# Patient Record
Sex: MALE | Race: OTHER | NOT HISPANIC OR LATINO | URBAN - METROPOLITAN AREA
[De-identification: names, ages, dates, MRNs, and addresses within clinical notes are randomized per-mention and may not be internally consistent; named-entity substitution may affect disease eponyms.]

---

## 2024-09-03 NOTE — ASU PATIENT PROFILE, ADULT - REASON FOR ADMISSION, PROFILE
PARS PLANA VITRECTOMY TO PEEL MACULAR PUCKER AND REMOVE VITREOUS HEMORRHAGE LEFT EYE, ENDO LASER POSSIBLE GAS OR OIL

## 2024-09-03 NOTE — ASU PATIENT PROFILE, ADULT - NS PREOP UNDERSTANDS INFO
No solid food/dairy/candy/gum after midnight tonight; water/clear apple juice/black coffee/tea/Gatorade allowed before 07:00am tomorrow; patient reminded to come with photo ID/insurance/credit card; dress in comfortable clothes; no jewelries/contact lens/valuable; no smoking/alcohol drinking/recreational drug use today; escort to have photo ID; address and callback number was given/yes

## 2024-09-03 NOTE — ASU PATIENT PROFILE, ADULT - ABLE TO REACH PT
Left voicemail message with instruction and arrive time on [phone] & [phone]; instructed to arrive at 08:45am; No solid food/dairy/candy/gum after midnight tonight; water/clear apple juice/black coffee/tea/Gatorade allowed before 05:30am tomorrow; patient reminded to come with photo ID/insurance/credit card; dress in comfortable clothes; no jewelries/contact lens/valuable; no smoking/alcohol drinking/recreational drug use today; escort to have photo ID; address and callback number was given/yes

## 2024-09-04 ENCOUNTER — OUTPATIENT (OUTPATIENT)
Dept: OUTPATIENT SERVICES | Facility: HOSPITAL | Age: 59
LOS: 1 days | Discharge: HOME | End: 2024-09-04

## 2024-09-04 VITALS
TEMPERATURE: 99 F | OXYGEN SATURATION: 96 % | DIASTOLIC BLOOD PRESSURE: 75 MMHG | HEART RATE: 85 BPM | SYSTOLIC BLOOD PRESSURE: 129 MMHG | RESPIRATION RATE: 16 BRPM

## 2024-09-04 VITALS
OXYGEN SATURATION: 98 % | DIASTOLIC BLOOD PRESSURE: 71 MMHG | HEIGHT: 67 IN | WEIGHT: 141.54 LBS | TEMPERATURE: 97 F | RESPIRATION RATE: 18 BRPM | HEART RATE: 87 BPM | SYSTOLIC BLOOD PRESSURE: 121 MMHG

## 2024-09-04 DIAGNOSIS — M79.89 OTHER SPECIFIED SOFT TISSUE DISORDERS: Chronic | ICD-10-CM

## 2024-09-04 DIAGNOSIS — H43.10 VITREOUS HEMORRHAGE, UNSPECIFIED EYE: ICD-10-CM

## 2024-09-04 DIAGNOSIS — H33.302 UNSPECIFIED RETINAL BREAK, LEFT EYE: ICD-10-CM

## 2024-09-04 LAB — GLUCOSE BLDC GLUCOMTR-MCNC: 122 MG/DL — HIGH (ref 70–99)

## 2024-09-04 DEVICE — LASER PROBE 25G CONSTELLATION
Type: IMPLANTABLE DEVICE | Status: NON-FUNCTIONAL
Removed: 2024-09-04

## 2024-09-04 RX ORDER — ACETAMINOPHEN 325 MG/1
650 TABLET ORAL ONCE
Refills: 0 | Status: DISCONTINUED | OUTPATIENT
Start: 2024-09-04 | End: 2024-09-04

## 2024-09-04 RX ORDER — INSULIN GLARGINE 100 [IU]/ML
32 INJECTION, SOLUTION SUBCUTANEOUS
Refills: 0 | DISCHARGE

## 2024-09-04 RX ORDER — LOSARTAN POTASSIUM 50 MG/1
1 TABLET ORAL
Refills: 0 | DISCHARGE

## 2024-09-04 RX ORDER — METFORMIN HYDROCHLORIDE 850 MG/1
1 TABLET, FILM COATED ORAL
Refills: 0 | DISCHARGE

## 2024-09-04 RX ORDER — EMPAGLIFLOZIN 10 MG/1
1 TABLET, FILM COATED ORAL
Refills: 0 | DISCHARGE

## 2024-09-04 RX ORDER — DULAGLUTIDE 1.5 MG/.5ML
3 INJECTION, SOLUTION SUBCUTANEOUS
Refills: 0 | DISCHARGE

## 2024-09-04 RX ORDER — TETRACAINE HCL 0.5 %
1 DROPS OPHTHALMIC (EYE) ONCE
Refills: 0 | Status: COMPLETED | OUTPATIENT
Start: 2024-09-04 | End: 2024-09-04

## 2024-09-04 RX ORDER — ASPIRIN 81 MG
1 TABLET, DELAYED RELEASE (ENTERIC COATED) ORAL
Refills: 0 | DISCHARGE

## 2024-09-04 RX ORDER — SIMVASTATIN 10 MG
1 TABLET ORAL
Refills: 0 | DISCHARGE

## 2024-09-04 RX ORDER — TROPICAMIDE 1 %
1 DROPS OPHTHALMIC (EYE)
Refills: 0 | Status: COMPLETED | OUTPATIENT
Start: 2024-09-04 | End: 2024-09-04

## 2024-09-04 RX ADMIN — Medication 1 DROP(S): at 08:00

## 2024-09-04 RX ADMIN — Medication 1 DROP(S): at 09:05

## 2024-09-04 RX ADMIN — Medication 1 DROP(S): at 09:10

## 2024-09-04 RX ADMIN — Medication 1 DROP(S): at 09:00

## 2024-09-04 NOTE — BRIEF OPERATIVE NOTE - NSICDXBRIEFPROCEDURE_GEN_ALL_CORE_FT
PROCEDURES:  Vitrectomy, with endolaser panretinal photocoagulation and epiretinal membrane removal 04-Sep-2024 11:37:18  Spencer Perkins

## 2024-09-04 NOTE — BRIEF OPERATIVE NOTE - NSICDXBRIEFPREOP_GEN_ALL_CORE_FT
PRE-OP DIAGNOSIS:  Vitreous hemorrhage 04-Sep-2024 11:37:30  Spencer Perkins  Epiretinal membrane, left 04-Sep-2024 11:37:41  Spencer Perkins

## 2024-09-04 NOTE — BRIEF OPERATIVE NOTE - NSICDXBRIEFPOSTOP_GEN_ALL_CORE_FT
POST-OP DIAGNOSIS:  Vitreous hemorrhage 04-Sep-2024 11:37:46  Spencer Perkins  Retinal break, left 04-Sep-2024 11:38:09  Spencer Perkins  Epiretinal membrane, left 04-Sep-2024 11:37:54  Spencer Perkins

## 2024-09-04 NOTE — PACU DISCHARGE NOTE - NSCLINEINSERTRD_GEN_ALL_CORE
07/22/22 1101   Hello, [Guardians Name / Ben Vilchis, this is [Caller Fredis Chen from [facility], and our clinical care team wanted to check on you / your child after your recent visit to the hospital  It will only take 3-5 minutes  Is this a good time? Discharge Call Type/ Specific Diagnosis: General Call   General Discharge Phone Call   Were your/your child's discharge instructions clear and understandable? Please tell me in your own words how to care for yourself/your child now that you're home Yes;Patient understood instructions   Have you filled your/your child's new prescriptions yet? Yes   What questions do you have about those medications? No questions   Are you/your child having any unusual symptoms or problems? (Specific to problem- i e , dressing, pain, bruising or swelling, procedure, etc ) No reported symptoms/problems   Do you have follow up appointment with your/your child's physician? Yes   Is there anything preventing you from keeping that appointment? No;Patient able to keep appointment   Are there any physicians, nurses, or hospital staff you would like us to recognize for doing a very good job? Nurse;PCA/Tech;PT/OT/RT/SLP  (Everyone did a great job!)   Thank you for taking the time to share with me about your care and recovery  Do you have any suggestions for us? No   This call resulted in: No interventions needed   Call Complete   Attempted Number of Calls 1   Discharge phone call complete? Complete   Hi, This is ________ from [facility]  This is just a courtesy call, and there is no need to call us back  Have a great day     (Called by [facility]) No

## 2024-09-04 NOTE — PRE-ANESTHESIA EVALUATION ADULT - NSANTHOSAYNRD_GEN_A_CORE
No. PRATIK screening performed.  STOP BANG Legend: 0-2 = LOW Risk; 3-4 = INTERMEDIATE Risk; 5-8 = HIGH Risk

## 2025-02-12 PROBLEM — E11.9 TYPE 2 DIABETES MELLITUS WITHOUT COMPLICATIONS: Chronic | Status: ACTIVE | Noted: 2024-09-03

## 2025-02-12 PROBLEM — E78.5 HYPERLIPIDEMIA, UNSPECIFIED: Chronic | Status: ACTIVE | Noted: 2024-09-03

## 2025-02-12 PROBLEM — I10 ESSENTIAL (PRIMARY) HYPERTENSION: Chronic | Status: ACTIVE | Noted: 2024-09-03

## 2025-02-26 ENCOUNTER — TRANSCRIPTION ENCOUNTER (OUTPATIENT)
Age: 60
End: 2025-02-26

## 2025-02-26 ENCOUNTER — INPATIENT (INPATIENT)
Facility: HOSPITAL | Age: 60
LOS: 0 days | Discharge: HOME | End: 2025-02-26
Attending: INTERNAL MEDICINE | Admitting: INTERNAL MEDICINE
Payer: MEDICAID

## 2025-02-26 VITALS
HEIGHT: 66 IN | TEMPERATURE: 98 F | DIASTOLIC BLOOD PRESSURE: 70 MMHG | HEART RATE: 85 BPM | OXYGEN SATURATION: 99 % | SYSTOLIC BLOOD PRESSURE: 139 MMHG | WEIGHT: 139.99 LBS | RESPIRATION RATE: 16 BRPM

## 2025-02-26 VITALS
SYSTOLIC BLOOD PRESSURE: 124 MMHG | HEART RATE: 77 BPM | RESPIRATION RATE: 15 BRPM | OXYGEN SATURATION: 97 % | DIASTOLIC BLOOD PRESSURE: 68 MMHG

## 2025-02-26 DIAGNOSIS — M79.89 OTHER SPECIFIED SOFT TISSUE DISORDERS: Chronic | ICD-10-CM

## 2025-02-26 DIAGNOSIS — R93.1 ABNORMAL FINDINGS ON DIAGNOSTIC IMAGING OF HEART AND CORONARY CIRCULATION: ICD-10-CM

## 2025-02-26 LAB
ANION GAP SERPL CALC-SCNC: 10 MMOL/L — SIGNIFICANT CHANGE UP (ref 7–14)
BUN SERPL-MCNC: 17 MG/DL — SIGNIFICANT CHANGE UP (ref 7–23)
CALCIUM SERPL-MCNC: 9.9 MG/DL — SIGNIFICANT CHANGE UP (ref 8.4–10.5)
CHLORIDE SERPL-SCNC: 103 MMOL/L — SIGNIFICANT CHANGE UP (ref 98–107)
CO2 SERPL-SCNC: 26 MMOL/L — SIGNIFICANT CHANGE UP (ref 22–31)
CREAT SERPL-MCNC: 0.88 MG/DL — SIGNIFICANT CHANGE UP (ref 0.5–1.3)
EGFR: 98 ML/MIN/1.73M2 — SIGNIFICANT CHANGE UP
GLUCOSE SERPL-MCNC: 92 MG/DL — SIGNIFICANT CHANGE UP (ref 70–99)
HCT VFR BLD CALC: 53.1 % — HIGH (ref 39–50)
HGB BLD-MCNC: 17.4 G/DL — HIGH (ref 13–17)
MCHC RBC-ENTMCNC: 27.8 PG — SIGNIFICANT CHANGE UP (ref 27–34)
MCHC RBC-ENTMCNC: 32.8 G/DL — SIGNIFICANT CHANGE UP (ref 32–36)
MCV RBC AUTO: 85 FL — SIGNIFICANT CHANGE UP (ref 80–100)
NRBC # BLD AUTO: 0 K/UL — SIGNIFICANT CHANGE UP (ref 0–0)
NRBC # FLD: 0 K/UL — SIGNIFICANT CHANGE UP (ref 0–0)
NRBC BLD AUTO-RTO: 0 /100 WBCS — SIGNIFICANT CHANGE UP (ref 0–0)
PLATELET # BLD AUTO: 270 K/UL — SIGNIFICANT CHANGE UP (ref 150–400)
POTASSIUM SERPL-MCNC: 4.1 MMOL/L — SIGNIFICANT CHANGE UP (ref 3.5–5.3)
POTASSIUM SERPL-SCNC: 4.1 MMOL/L — SIGNIFICANT CHANGE UP (ref 3.5–5.3)
RBC # BLD: 6.25 M/UL — HIGH (ref 4.2–5.8)
RBC # FLD: 13.9 % — SIGNIFICANT CHANGE UP (ref 10.3–14.5)
SODIUM SERPL-SCNC: 139 MMOL/L — SIGNIFICANT CHANGE UP (ref 135–145)
WBC # BLD: 6.25 K/UL — SIGNIFICANT CHANGE UP (ref 3.8–10.5)
WBC # FLD AUTO: 6.25 K/UL — SIGNIFICANT CHANGE UP (ref 3.8–10.5)

## 2025-02-26 PROCEDURE — 99152 MOD SED SAME PHYS/QHP 5/>YRS: CPT

## 2025-02-26 PROCEDURE — 93010 ELECTROCARDIOGRAM REPORT: CPT

## 2025-02-26 PROCEDURE — 93458 L HRT ARTERY/VENTRICLE ANGIO: CPT | Mod: 26

## 2025-02-26 RX ORDER — LISINOPRIL 30 MG/1
1 TABLET ORAL
Refills: 0 | DISCHARGE

## 2025-02-26 RX ORDER — NITROGLYCERIN 20 MG/G
1 OINTMENT TOPICAL
Qty: 1 | Refills: 1
Start: 2025-02-26

## 2025-02-26 RX ADMIN — Medication 75 MILLILITER(S): at 13:05

## 2025-02-26 RX ADMIN — Medication 1000 MILLILITER(S): at 12:13

## 2025-02-26 NOTE — ASU DISCHARGE PLAN (ADULT/PEDIATRIC) - NS MD DC FALL RISK RISK
For information on Fall & Injury Prevention, visit: [email or web address] OR  [email or web address] OR  https://www.cdc.gov/steadi/patient.html

## 2025-02-26 NOTE — ASU DISCHARGE PLAN (ADULT/PEDIATRIC) - FINANCIAL ASSISTANCE
[facility] provides services at a reduced cost to those who are determined to be eligible through [facility]’s financial assistance program. Information regarding [facility]’s financial assistance program can be found by going to [email or web address] or by calling [phone].

## 2025-02-26 NOTE — ASU DISCHARGE PLAN (ADULT/PEDIATRIC) - ASU DC SPECIAL INSTRUCTIONSFT
WOUND CARE:  The day after your procedure:  - Remove the bandage at the site gently, clean with a small amount of soap and water, and pat try. Leave open to air.  - You may shower.  - Do not apply lotions, creams, ointments, powder, or perfumes to your incision site.  - Check your wrist daily. A small amount of bruising or soreness is normal.  - Do not soak the procedural site for 1 week (no baths, pools, tubs, etc).  ------------------------------------------------------  ACTIVITY:  For the next 24 hours:  - Do not drive or operate heavy machinery.  - Do not drink any alcoholic beverages.  - Do not make any important decisions or sign legal documents.  ----------------------------------------------------  If your procedure was performed through the wrist,  For the next 3 days:  - Avoid pushing and pulling with the affected wrist.   - Avoid repeated movement of the affected hand and wrist (typing, hammering).  - Do not lift anything more than 5 pounds.  -------------------------------------------------  MEDICATION:   - Take your medications as explained (see attached medication reconciliation on discharge paperwork).  -INCREASE Simvastatin (Zocor) to 40mg oral daily  - HOLD Metformin and resume on Saturday 3/1  -------------------------------------------------  ADDITIONAL INSTRUCTIONS:  - Follow a heart healthy diet recommended by your doctor.   - If you smoke, we encourage smoking cessation. You may call [phone] for [place] of tobacco control if you need assistance.  - Call your doctor Trever Baeza to make appointment within 1-2 weeks to further discuss heart artery blockage treatment   - Please be in touch with Cardiothoracic surgery office  to schedule an appt with a Surgeon for further treatment options of your heart blockages.   - NITROGLYCERIN UNDER THE TONGUE SPRAY ONLY AS NEEDED FOR SEVERE CHEST PAIN, IF NO RELIEF AFTER 3 SPRAYS REPORT TO NEAREST EMERGENCY ROOM   ------------------------------------------------------  ***CALL YOUR DOCTOR***  - If you experience fever, chills, body aches, or severe pain, swelling, redness, heat, or yellow discharge from your incision site.  - If you notice bleeding or significant swelling at the procedural site.  - If you experience chest pain.  - If you experience extremity numbness, tingling, or temperature change.  ----------------------------------------------  If you are unable to get in contact with your doctor, you may contact the Interventional Recovery Suite at [phone].  Please reference your discharge instructions for any questions or concerns.

## 2025-02-26 NOTE — H&P CARDIOLOGY - ALCOHOL USE HISTORY SINGLE SELECT
Admit to INPATIENT telemetry. Will need more than 2 midnights for diuresis, monitoring kidney function, K+  IV Lasix and fluid restriction  Continue BB     never

## 2025-02-26 NOTE — H&P CARDIOLOGY - HISTORY OF PRESENT ILLNESS
59 year old male with HTN, hyperlipidemia, DM type 2 who presented to his Cardiologist complaining of SANCHEZ walking 2-3 blocks or up 2 flights stairs, relieved with rest.  Denies chest pain, dizziness, syncope, edema, orthopnea, increase in fatigue and decrease in exercise tolerance. Underwent a CT coronaries 1/16/25 with mLAd 70-90, coronary calcium score 1051, high suspicion for multifocal moderate stenosis.   In light of patients cardiac risk factors, symptoms and abnormal noninvasive test findings there is high suspicion for CAD. Patient is now referred to [facility] for a cardiac catheterization with possible PTCA/stent.     Referring MD: Trever Baeza

## 2025-02-26 NOTE — H&P CARDIOLOGY - COMMENTS
pre cath IVF hydration    Pre-sedation Evaluation  Dentures: None  Last PO intake:  2/25 at 2000  Obstructive sleep apnea: No  Aspiration risk: No  Mallampati score: 2  ASA Classification: 2  Prior Sedative or Anesthesia Experience: No complications  Informed consent by responsible adult: Yes  Responsible adult escort: Yes  Based on today's assessment, anesthesia consult requested: No

## 2025-02-26 NOTE — ASU DISCHARGE PLAN (ADULT/PEDIATRIC) - CARE PROVIDER_API CALL
ROSALINDA FENG  [address], [address], [place] [address]  Phone: ([phone]  Fax: ()-  Follow Up Time:

## 2025-03-04 PROBLEM — I10 BENIGN HYPERTENSION: Status: ACTIVE | Noted: 2025-03-04

## 2025-03-04 PROBLEM — I25.10 3-VESSEL CAD: Status: ACTIVE | Noted: 2025-03-04

## 2025-03-04 PROBLEM — Z86.79 HISTORY OF HYPERTENSION: Status: RESOLVED | Noted: 2025-03-04 | Resolved: 2025-03-04

## 2025-03-04 PROBLEM — Z78.9 NO HISTORY OF ALCOHOL USE: Status: ACTIVE | Noted: 2025-03-04

## 2025-03-04 PROBLEM — E78.2 HYPERLIPIDEMIA, MIXED: Status: ACTIVE | Noted: 2025-03-04

## 2025-03-04 PROBLEM — Z00.00 ENCOUNTER FOR PREVENTIVE HEALTH EXAMINATION: Status: ACTIVE | Noted: 2025-03-04

## 2025-03-04 PROBLEM — Z78.9 NON-SMOKER: Status: ACTIVE | Noted: 2025-03-04

## 2025-03-04 RX ORDER — ASPIRIN ENTERIC COATED TABLETS 81 MG 81 MG/1
81 TABLET, DELAYED RELEASE ORAL DAILY
Qty: 30 | Refills: 0 | Status: ACTIVE | COMMUNITY
Start: 2025-03-04

## 2025-03-04 RX ORDER — ATENOLOL 25 MG/1
25 TABLET ORAL DAILY
Qty: 30 | Refills: 0 | Status: ACTIVE | COMMUNITY
Start: 2025-03-04

## 2025-03-04 RX ORDER — EMPAGLIFLOZIN 25 MG/1
25 TABLET, FILM COATED ORAL
Qty: 90 | Refills: 1 | Status: ACTIVE | COMMUNITY
Start: 2025-03-04

## 2025-03-04 RX ORDER — DULAGLUTIDE 3 MG/.5ML
3 INJECTION, SOLUTION SUBCUTANEOUS
Qty: 6 | Refills: 0 | Status: ACTIVE | COMMUNITY
Start: 2025-03-04

## 2025-03-04 RX ORDER — LOSARTAN POTASSIUM 50 MG/1
50 TABLET, FILM COATED ORAL DAILY
Qty: 30 | Refills: 5 | Status: ACTIVE | COMMUNITY
Start: 2025-03-04

## 2025-03-04 RX ORDER — INSULIN GLARGINE 100 [IU]/ML
100 INJECTION, SOLUTION SUBCUTANEOUS
Qty: 4 | Refills: 0 | Status: ACTIVE | COMMUNITY
Start: 2025-03-04

## 2025-03-04 RX ORDER — METFORMIN HYDROCHLORIDE 500 MG/1
500 TABLET, COATED ORAL
Qty: 120 | Refills: 0 | Status: ACTIVE | COMMUNITY
Start: 2025-03-04

## 2025-03-05 ENCOUNTER — APPOINTMENT (OUTPATIENT)
Dept: CARDIOTHORACIC SURGERY | Facility: CLINIC | Age: 60
End: 2025-03-05
Payer: MEDICAID

## 2025-03-05 VITALS
RESPIRATION RATE: 16 BRPM | TEMPERATURE: 98 F | HEIGHT: 66 IN | SYSTOLIC BLOOD PRESSURE: 116 MMHG | WEIGHT: 140 LBS | BODY MASS INDEX: 22.5 KG/M2 | OXYGEN SATURATION: 98 % | HEART RATE: 84 BPM | DIASTOLIC BLOOD PRESSURE: 69 MMHG

## 2025-03-05 DIAGNOSIS — Z78.9 OTHER SPECIFIED HEALTH STATUS: ICD-10-CM

## 2025-03-05 DIAGNOSIS — Z86.79 PERSONAL HISTORY OF OTHER DISEASES OF THE CIRCULATORY SYSTEM: ICD-10-CM

## 2025-03-05 DIAGNOSIS — I10 ESSENTIAL (PRIMARY) HYPERTENSION: ICD-10-CM

## 2025-03-05 DIAGNOSIS — I25.10 ATHEROSCLEROTIC HEART DISEASE OF NATIVE CORONARY ARTERY W/OUT ANGINA PECTORIS: ICD-10-CM

## 2025-03-05 DIAGNOSIS — E78.2 MIXED HYPERLIPIDEMIA: ICD-10-CM

## 2025-03-05 PROCEDURE — 99245 OFF/OP CONSLTJ NEW/EST HI 55: CPT

## 2025-03-05 RX ORDER — SIMVASTATIN 40 MG/1
40 TABLET, FILM COATED ORAL
Refills: 0 | Status: ACTIVE | COMMUNITY
Start: 2025-03-04

## 2025-03-31 ENCOUNTER — NON-APPOINTMENT (OUTPATIENT)
Age: 60
End: 2025-03-31

## 2025-04-07 ENCOUNTER — TRANSCRIPTION ENCOUNTER (OUTPATIENT)
Age: 60
End: 2025-04-07

## (undated) DEVICE — VENODYNE/SCD SLEEVE CALF MEDIUM

## (undated) DEVICE — PACK VITRECTOMY BEVEL 25G PLUS 10K

## (undated) DEVICE — CANNULA ALCON SOFT TIP 25G

## (undated) DEVICE — Device

## (undated) DEVICE — PACK VITRECTOMY  LF

## (undated) DEVICE — INSTR GRIESHABER REVOLUTION SCISSOR 25G (CURVED) DISP

## (undated) DEVICE — SUT VICRYL 8-0 12" TG140-8 DA

## (undated) DEVICE — DRAPE MICROSCOPE KNOB COVER SMALL (2 PCS)

## (undated) DEVICE — WARMING BLANKET UPPER ADULT

## (undated) DEVICE — CONSTELLATION VFC PAK

## (undated) DEVICE — LENSE FLAT

## (undated) DEVICE — GLV 7.5 PROTEXIS (WHITE)

## (undated) DEVICE — ILM RESOLUTION FORCEP 25G DISP